# Patient Record
(demographics unavailable — no encounter records)

---

## 2024-11-15 NOTE — ASSESSMENT
[FreeTextEntry1] : Ms. CURTIS is a 51-year-old, nonsmoking, female with a history of asthma, nasal polypectomy, sob/chronic cough/ allergies/ severe persistent asthma/ Eosinophilic asthma (was on Nucala, now on Tezspire autoinjector; due for 3rd Catrachito inj 11/28), ?TBM, Abnormal Chest CT. She presents for a follow up visit. She is accompanied by her . Her chief concern is difficulty clearing mucus in throat upon awakening.   1. The patient's SOB is felt to be multifactorial: -poor mechanics of breathing -out of shape/overweight -Pulmonary  -asthma -Cardiac (doubtful)  2. Severe persistent Asthma/Eosinophilic: - Continue Tezspire Sub-Q Q Monthly (3rd injection due 11/28).  - Continue Breztri 2 BID. Rinse and gargle post use.  - add Daliresp 500 mg QAM - take 1/2 pill for 2 weeks, then a full pill thereafter  - continue Albuterol 0.63% via nebulizer or 2 puffs HFA Q6H PRN and pre-exercise.  3. Allergies: - Could benefit from Olopatadine given mucus symptom could be r/t PND. - Patient c/o nasal dryness - Add AYR Nasal Saline GEL.   4. Abnormal Chest CT: - ?TBM: Dynamic chest CT has been in place.  - Add Aerobika/Acapella device BID-TID 15-20 exhalations.   5. LPR/ GERD: - add Pepcid 40 mg QHS.  Patient to follow up with Dr. Grant as scheduled. Patient to call with further questions and concerns. Patient verbalizes understanding of care plan and is agreeable.

## 2024-11-15 NOTE — PROCEDURE
[FreeTextEntry1] : Pulmonary testing performed in office today because of Asthma.   PFT's performed in office show obstructive lung defect as noted by FEV1 and FEF 25-75%.  Normal diffusion capacity.  Normal lung volumes. FEV1: 52% FEV1/FVC Ratio: 72% QHD65-48%: 25% DLCO: 80%

## 2024-11-15 NOTE — DISCUSSION/SUMMARY
[FreeTextEntry1] : - 1/05/2021 CT SCAN OF CHEST Comparison: None. Impression: There is bilateral mosaic perfusion pattern involving predominantly the upper, mid and superior aspects of the lower lung zones. There is some bronchial wall thickening with some small mucous plugging within the right middle and left lower lobes. This most probably represents air trapping from underlying small airways inflammation. This may be of infectious and/or inflammatory noninfectious etiologies. Abbreviated differential includes viral, bacterial, asthma and hypersensitivity pneumonia. The absence of pulmonary artery dilatation mitigates against chronic thromboembolic disease. Further imaging may be of value to include HRCT to be performed at end inspiration/end expiration for more complete evaluation.

## 2024-11-15 NOTE — REASON FOR VISIT
[Follow-Up] : a follow-up visit [Spouse] : spouse [Abnormal CXR/ Chest CT] : an abnormal CXR/ chest CT [Asthma] : asthma [Shortness of Breath] : shortness of breath

## 2024-11-15 NOTE — REVIEW OF SYSTEMS
[SOB on Exertion] : sob on exertion [Negative] : Endocrine [Cough] : no cough [Chest Tightness] : no chest tightness [Dyspnea] : no dyspnea [Wheezing] : no wheezing

## 2024-11-15 NOTE — HISTORY OF PRESENT ILLNESS
[TextBox_4] : Ms. CURTIS is a 51-year-old, nonsmoking, female with a history of asthma, nasal polypectomy, sob/chronic cough/ allergies/ severe persistent asthma/ Eosinophilic asthma (was on Nucala, now on Tezspire autoinjector; due for 3rd Catrachito inj 11/28), ?TBM, Abnormal Chest CT. She presents for a follow up visit. She is accompanied by her .   Her chief concern is difficulty clearing mucus in throat upon awakening.   Patient states she is compliant on Breztri 2 puffs twice daily. She states she uses albuterol prior to working out but not any other time. She states she is due for her third dose of test by her on 11/28. She states she is not currently on nasal sprays as her nose has been so dry that it bleeds. She states she is not currently on famotidine.  Patient denies fever/chills, decreased appetite, increased fatigue, cough, wheezing, shortness of breath at rest or exertion, or chest tightness at this time.

## 2025-04-03 NOTE — ADDENDUM
[FreeTextEntry1] : Documented by Alfred Zimmerman acting as a scribe for Dr. Ruben Grant on 04/03/2025. All medical record entries made by the Scribe were at my, Dr. Ruben Grant's, direction and personally dictated by me on 04/03/2025. I have reviewed the chart and agree that the record accurately reflects my personal performance of the history, physical exam, assessment and plan. I have also personally directed, reviewed, and agree with the discharge instructions.

## 2025-04-03 NOTE — PROCEDURE
[FreeTextEntry1] : Full PFT reveals mild restrictive and obstructive dysfunction; FEV1 was 1.40 L which is 56% of predicted; normal lung volumes; normal diffusion at 17.48, which is 88% of predicted; normal flow volume loop. PFTs were performed to evaluate for SOB  FENO was 98; a normal value being less than 25 Fractional exhaled nitric oxide (FENO) is regarded as a simple, noninvasive method for assessing eosinophilic airway inflammation. Produced by a variety of cells within the lung, nitric oxide (NO) concentrations are generally low in healthy individuals. However, high concentrations of NO appear to be involved in nonspecific host defense mechanisms and chronic inflammatory diseases such as asthma. The American Thoracic Society (ATS) therefore has recommended using FENO to aid in the diagnosis and monitoring of eosinophilic airway inflammation and asthma, and for identifying steroid responsive individuals whose chronic respiratory symptoms may be caused by airway inflammation.

## 2025-04-03 NOTE — ASSESSMENT
[FreeTextEntry1] : Ms. CURTIS is a 51 year female with a history of asthma, nonsmoker, nasal polypectomy, who now comes in for a follow up pulmonary evaluation. Her chief complaint is sob/chronic cough/ allergies/ severe persistent asthma/ ?TBM/ c/w Eosinophilic asthma- better controlled - Nucala since 04/2022- stable/improved on Tezspire, but sinus Sx  The patient's SOB is felt to be multifactorial: -poor mechanics of breathing -out of shape/overweight -Pulmonary   -asthma -Cardiac (doubtful)  Problem 1: Severe persistent Asthma (controlled) - Breztri 2 BID  - Zyflo 200 mg BID or Accolate 20 mg BID -continue Daliresp 500 mg QAM  -continue Albuterol 0.63% via nebulizer, Q6H - Fasenra/ Nucala candidate- s/p to Nucala 100 monthly in place since 4/2022- now Tezspire -add Prednisone 20 mg x 7 days, 10 mg x 7 days -The safety and efficacy of Nucala was established in three double-blind, randomized, placebo-controlled trials in patients with severe asthma. Compared to a placebo, patients with severe asthma receiving Nucala had fewer exacerbation requiring hospitalization and/or emergency department visits, and a longer time to first exacerbation. In addition, patients with severe asthma receiving Nucala or Fasenra experienced greater reductions in their daily maintenance oral corticosteroid dose, while maintaining asthma control compared with patients receiving placebo. Treatment with Nucala did not result in a significant improvement in lung function, as measured by the volume of air exhaled by patients in one second. The most common side effects include: headache, injection site reactions, back pain, weakness, and fatigue; hypersensitivity reactions can occur within hours or days including swelling of the face, mouth, and tongue, fainting, dizziness, hives, breathing problems, and rash; herpes zoster infections have occurred. The drug is a monoclonal antibody that inhibits interleukin-5 which helps regular eosinophils, a type of white blood cell that contributes to asthma. The over-production of eosinophils can cause inflammation in the lungs, increasing the frequency of asthma attacks. Patients must also take other medications, including high dose inhaled corticosteroids and at least one additional asthma drug - Asthma is believed to be caused by inherited (genetic) and environmental factor, but its exact cause is unknown. asthma may be triggered by allergens, lung infections, or irritants in the air. Asthma triggers are different for each person - Inhaler technique reviewed as well as oral hygiene technique reviewed with patient. Avoidance of cold air, extremes of temperature, rescue inhaler should be used before exercise. Order of medication reviewed with patient. Recommended use of a cool mist humidifier in the bedroom.  Problem 1A: eosinophilic asthma (active) -s/p numbers and consider for biologic therapy -s/p dupixent and nucala- Tezspire monthly 7/2025 Dupixent is a prescription medicine used with other asthma medicines for the maintenance treatment of moderate-to-severe asthma in people aged 12 years and older whose asthma is not controlled with their current asthma medicines. Dupixent helps prevent severe asthma attacks (exacerbations) and can improve your breathing. Dupixent may also help reduce the amount of oral corticosteroids you need while preventing severe asthma attacks and improving your breathing. Dupixent is not used to treat sudden breathing problems. Risks and side effect of Dupixent were discussed and reviewed with patient.  Problem 2: Allergies/sinus -continue Olopatadine 0.6% 1 sniff BID or Ryaltris 1 sniff BID -continue Flonase 1 BID  -s/p blood work, Alpha-1 antitrypsin, and aspergillus percipient WNL -Environmental measures for allergies were encouraged including mattress and pillow cover, air purifier, and environmental controls.  Problem 2A: sinusitis (2025) -add Prednisone 20 mg x 7 days, 10 mg x 7 days  Problem 3: Tracheomalacia -complete Dynamic CT of the chest -[place] Tracheomalacia is usually acquired in adults and common causes include damage by tracheostomy or endotracheal intubation damaging the tracheal cartilage with increase risk with multiple intubations, prolonged intubation, and concurrent high dose steroid therapy; external chest wall trauma and surgery; chronic compression of the trachea by benign etiologies (eg, benign mediastinal goiter) or malignancy; relapsing polychondritis; or recurrent infection. Tracheomalacia can be asymptomatic, however signs or symptoms can develop as the severity of the airway narrowing progresses with major symptoms include dyspnea, cough, and sputum retention. Other symptoms include severe paroxysms of coughing, wheezing or stridor, barking cough and may be exacerbated by forced expiration, cough, and valsalva maneuver. Tracheomalacia is diagnosed by a bronchoscopic visualization of dynamic airway collapse on dynamic chest CT. Therapy is warranted in symptomatic patients with severe tracheomalacia and includes surgical repair as tracheobronchoplasty. The patient was referred to Dr. Atul Beach or Dr. Joshua Hay, at [facility] for a surgical consult.  Problem 4: LPR/ GERD -add Pepcid 40 mg QHS -Rule of 2s: avoid eating too much, eating too late, eating too spicy, eating two hours before bed. - Things to avoid including overeating, spicy foods, tight clothing, eating within two hours of bed, this list is not all inclusive. - For treatments of reflux, possible options discussed including diet control, H2 blockers, PPIs, as well as coating motility agents discussed as treatment options. Timing of meals and proximity of last meal to sleep were discussed. If symptoms persist, a formal gastrointestinal evaluation is needed.  Problem 5:No OSMANI -s/p home sleep study- WNL -Sleep apnea is associated with adverse clinical consequences which can affect most organ systems. Cardiovascular disease risk includes arrhythmias, atrial fibrillation, hypertension, coronary artery disease, and stroke. Metabolic disorders include diabetes type 2, non-alcoholic fatty liver disease. Mood disorder especially depression; and cognitive decline especially in the elderly. Associations with chronic reflux/Mendenhall's esophagus some but not all inclusive. -Reasons include arousal consistent with hypopnea; respiratory events most prominent in REM sleep or supine position; therefore sleep staging and body position are important for accurate diagnosis and estimation of AHI.  Problem 6: Cardiac -Recommend cardiac follow up evaluation with cardiologist if needed  Problem 7: overweight/out of shape -recommended Berberine  - Weight loss, exercise and diet control were discussed and are highly encouraged. Treatment options were given such as aqua therapy, and contacting a nutritionist. Recommended to use the elliptical, stationary bike, less use of treadmill. Mindful eating was explained to the patient. Obesity is associated with worsening asthma, SOB, and potential for cardiac disease, diabetes, and other underlying medical conditions.  Problem 8: Poor mechanics of breathing -Recommended Darnell Rubio and Tobin breathing techniques - Proper breathing techniques were reviewed with an emphasis on exhalation. Patient instructed to breath in for 1 second and out for four seconds. Patient was encouraged not to talk while walking.  Problem 9: Health Maintenance -s/p covid 19 vaccine x1 J&J -s/p flu shot refused -recommended strep pneumonia vaccines: Prevnar-13 vaccine, follow by Pneumo vaccine 23 one year following -recommended early intervention for URIs -recommended regular osteoporosis evaluations -recommended early dermatological evaluations -recommended after the age of 50 to the age of 70, colonoscopy every 5 years  f/u in 6-8 weeks pt is encouraged to call or fax the office with any questions or concerns.

## 2025-04-03 NOTE — REASON FOR VISIT
[Follow-Up] : a follow-up visit [Spouse] : spouse [TextBox_44] :  sob/chronic cough/ allergies/ severe persistent asthma/ ?TBM/ ?OSMANI

## 2025-04-03 NOTE — PHYSICAL EXAM
[No Acute Distress] : no acute distress [Normal Oropharynx] : normal oropharynx [III] : Mallampati Class: III [Normal Appearance] : normal appearance [No Neck Mass] : no neck mass [Normal Rate/Rhythm] : normal rate/rhythm [Normal S1, S2] : normal s1, s2 [No Murmurs] : no murmurs [No Resp Distress] : no resp distress [Clear to Auscultation Bilaterally] : clear to auscultation bilaterally [No Abnormalities] : no abnormalities [Benign] : benign [Normal Gait] : normal gait [No Clubbing] : no clubbing [No Cyanosis] : no cyanosis [No Edema] : no edema [FROM] : FROM [Normal Color/ Pigmentation] : normal color/ pigmentation [No Focal Deficits] : no focal deficits [Oriented x3] : oriented x3 [Normal Affect] : normal affect [TextBox_2] : OW [TextBox_68] : I:E 1:3, mild expiratory wheeze

## 2025-04-03 NOTE — HISTORY OF PRESENT ILLNESS
[TextBox_4] : Ms. CURTIS is a 51 year female with a history of asthma, nonsmoker, nasal polypectomy, who now comes in for a follow up pulmonary evaluation. Her chief complaint is chronic cough/ asthma   -she notes s/p sickness in previous weeks (influenza) -she notes she cannot breathe due to nasal congestion -she notes at home IV with Xofluza  -she notes chest pain from her difficulty breathing -she notes good quality of sleep -she notes getting enough sleep -she notes sleeping for 7 hours -she notes active rhinitis -she notes doing her Tezspire injection -she notes using her Breztri and sometimes Daliresp  -she denies any headaches, nausea, emesis, fever, chills, sweats, chest pain, chest pressure, coughing, wheezing, palpitations, diarrhea, constipation, dysphagia, vertigo, arthralgias, myalgias, leg swelling, itchy eyes, itchy ears, heartburn, reflux, or sour taste in the mouth.